# Patient Record
Sex: FEMALE | Race: BLACK OR AFRICAN AMERICAN
[De-identification: names, ages, dates, MRNs, and addresses within clinical notes are randomized per-mention and may not be internally consistent; named-entity substitution may affect disease eponyms.]

---

## 2018-03-15 ENCOUNTER — HOSPITAL ENCOUNTER (OUTPATIENT)
Dept: HOSPITAL 45 - C.LAB | Age: 38
Discharge: HOME | End: 2018-03-15
Attending: HOSPITALIST
Payer: COMMERCIAL

## 2018-03-15 DIAGNOSIS — M62.838: Primary | ICD-10-CM

## 2018-03-15 DIAGNOSIS — M79.1: ICD-10-CM

## 2018-08-01 ENCOUNTER — HOSPITAL ENCOUNTER (OUTPATIENT)
Dept: HOSPITAL 45 - C.ULTRBC | Age: 38
Discharge: HOME | End: 2018-08-01
Attending: FAMILY MEDICINE
Payer: COMMERCIAL

## 2018-08-01 DIAGNOSIS — R10.2: Primary | ICD-10-CM

## 2018-08-01 NOTE — DIAGNOSTIC IMAGING REPORT
ULTRASOUND OF THE PELVIS 



CLINICAL HISTORY: Pelvic pain.



COMPARISON STUDY: No priors.



TECHNIQUE: Real-time, grayscale, and color flow sonography of the pelvis is

performed both transabdominally and endovaginally. Images are reviewed in the

transverse and longitudinal planes.



FINDINGS:



Uterus: The uterus is normal in size and echotexture, measuring 10.0 x 4.1 x 5.1

cm. A small above seen cyst is incidentally noted in the cervix.



Endometrium: The endometrium is normal in appearance, and the endometrial stripe

is normal in thickness measuring up to 0.5 cm.



Ovaries: The ovaries are normal in size and morphology. The right ovary measures

2.7 x 1.3 x 2.0 cm and the left ovary measures 2.7 x 2.0 x 2.6 cm. Small

follicles are seen bilaterally. Normal Doppler waveforms are shown within both

ovaries.



Pelvis: There is no free fluid in the cul-de-sac. No concerning adnexal lesion

is seen.





IMPRESSION: No acute sonographic abnormality is identified in the pelvis.







Electronically signed by:  Betito Lopez M.D.

8/1/2018 3:12 PM



Dictated Date/Time:  8/1/2018 3:11 PM